# Patient Record
Sex: FEMALE | Race: WHITE | NOT HISPANIC OR LATINO | Employment: FULL TIME | URBAN - NONMETROPOLITAN AREA
[De-identification: names, ages, dates, MRNs, and addresses within clinical notes are randomized per-mention and may not be internally consistent; named-entity substitution may affect disease eponyms.]

---

## 2023-04-03 ENCOUNTER — OFFICE VISIT (OUTPATIENT)
Dept: URGENT CARE | Facility: CLINIC | Age: 43
End: 2023-04-03

## 2023-04-03 VITALS
HEART RATE: 88 BPM | TEMPERATURE: 97.8 F | DIASTOLIC BLOOD PRESSURE: 62 MMHG | SYSTOLIC BLOOD PRESSURE: 110 MMHG | OXYGEN SATURATION: 99 % | RESPIRATION RATE: 18 BRPM

## 2023-04-03 DIAGNOSIS — J06.9 ACUTE RESPIRATORY DISEASE: Primary | ICD-10-CM

## 2023-04-03 DIAGNOSIS — J02.9 SORE THROAT: ICD-10-CM

## 2023-04-03 LAB — S PYO AG THROAT QL: NEGATIVE

## 2023-04-03 RX ORDER — BUTYROSPERMUM PARKII(SHEA BUTTER), SIMMONDSIA CHINENSIS (JOJOBA) SEED OIL, ALOE BARBADENSIS LEAF EXTRACT .01; 1; 3.5 G/100G; G/100G; G/100G
LIQUID TOPICAL
COMMUNITY
Start: 2019-09-01

## 2023-04-03 RX ORDER — BROMPHENIRAMINE MALEATE, PSEUDOEPHEDRINE HYDROCHLORIDE, AND DEXTROMETHORPHAN HYDROBROMIDE 2; 30; 10 MG/5ML; MG/5ML; MG/5ML
10 SYRUP ORAL 4 TIMES DAILY PRN
Qty: 120 ML | Refills: 0 | Status: SHIPPED | OUTPATIENT
Start: 2023-04-03

## 2023-04-03 RX ORDER — MULTIVITAMIN/IRON/FOLIC ACID 18MG-0.4MG
TABLET ORAL
COMMUNITY

## 2023-04-03 NOTE — LETTER
April 3, 2023     Patient: Pepe Broderick   YOB: 1980   Date of Visit: 4/3/2023       To Whom It May Concern: It is my medical opinion that Pepe Broderick may return provided symptoms have improved and has remained fever free for 24 hours without fever reducing medications  If you have any questions or concerns, please don't hesitate to call           Sincerely,        Jesse Garland PA-C    CC: No Recipients

## 2023-04-03 NOTE — PROGRESS NOTES
[facility]        NAME: Pepe Broderick is a 36 y o  female  : 1980    MRN: [id number]  DATE: April 3, 2023  TIME: 12:26 PM    Assessment and Plan   Acute respiratory disease [J06 9]  1  Acute respiratory disease  brompheniramine-pseudoephedrine-DM 30-2-10 MG/5ML syrup      2  Sore throat  POCT rapid strepA    Throat culture            Patient Instructions     Bromfed DM as prescribed  Flonase over the counter  Vitamin D3 2000 IU daily  Vitamin C 1000mg twice per day  Multivitamin daily  Fluids and rest  Salt water gargles  Throat Coat Tea with Honey  Follow up with PCP in 3-5 days  Proceed to  ER if symptoms worsen  Chief Complaint     Chief Complaint   Patient presents with   • Earache   • Sore Throat   • Headache     Started Friday, bilateral earache, sore throat  OTC mucinex  Request note for Monday   • Cough         History of Present Illness       Patient has been taking mucinex for symptoms  Earache   There is pain in both ears  This is a new problem  Episode onset: Friday  There has been no fever  Associated symptoms include coughing (productive), headaches, rhinorrhea and a sore throat  Pertinent negatives include no abdominal pain, diarrhea, ear discharge, neck pain or vomiting  Sore Throat   This is a new problem  The current episode started in the past 7 days  The problem has been gradually worsening  Neither side of throat is experiencing more pain than the other  There has been no fever  The pain is at a severity of 4/10  Associated symptoms include congestion, coughing (productive), ear pain, headaches, a hoarse voice, a plugged ear sensation and swollen glands  Pertinent negatives include no abdominal pain, diarrhea, drooling, ear discharge, neck pain, shortness of breath, stridor, trouble swallowing or vomiting  She has had no exposure to strep or mono  Review of Systems   Review of Systems   Constitutional: Negative for chills and fever     HENT: Positive for congestion, ear pain, hoarse voice, rhinorrhea, sinus pressure (minor) and sore throat  Negative for drooling, ear discharge, sinus pain and trouble swallowing  Respiratory: Positive for cough (productive)  Negative for shortness of breath and stridor  Gastrointestinal: Negative for abdominal pain, diarrhea and vomiting  Musculoskeletal: Negative for neck pain  Neurological: Positive for headaches  Current Medications       Current Outpatient Medications:   •  brompheniramine-pseudoephedrine-DM 30-2-10 MG/5ML syrup, Take 10 mL by mouth 4 (four) times a day as needed for congestion or cough, Disp: 120 mL, Rfl: 0  •  Cholecalciferol (D3 ) 50 MCG ( UT) CAPS, , Disp: , Rfl:   •  cyanocobalamin (VITAMIN B-12) 1000 MCG tablet, Take 1,000 mcg by mouth daily, Disp: , Rfl:   •  multivitamin-minerals (CENTRUM ADULTS) tablet, , Disp: , Rfl:     Current Allergies     Allergies as of 2023   • (No Known Allergies)            The following portions of the patient's history were reviewed and updated as appropriate: allergies, current medications, past family history, past medical history, past social history, past surgical history and problem list      History reviewed  No pertinent past medical history  Past Surgical History:   Procedure Laterality Date   • APPENDECTOMY     •  SECTION         No family history on file  Medications have been verified  Objective   /62   Pulse 88   Temp 97 8 °F (36 6 °C)   Resp 18   LMP  (LMP Unknown) Comment: uterine ablation  SpO2 99%   No LMP recorded (lmp unknown)  Physical Exam     Physical Exam  Vitals reviewed  Constitutional:       General: She is not in acute distress  Appearance: She is well-developed  She is not diaphoretic  HENT:      Head: Normocephalic        Right Ear: Tympanic membrane and external ear normal       Left Ear: Tympanic membrane and external ear normal       Nose: Nose normal  Mouth/Throat:      Pharynx: Uvula midline  Posterior oropharyngeal erythema present  No oropharyngeal exudate  Tonsils: No tonsillar exudate  Eyes:      Conjunctiva/sclera: Conjunctivae normal    Cardiovascular:      Rate and Rhythm: Normal rate and regular rhythm  Heart sounds: Normal heart sounds  No murmur heard  No friction rub  No gallop  Pulmonary:      Effort: Pulmonary effort is normal  No respiratory distress  Breath sounds: Normal breath sounds  No wheezing, rhonchi or rales  Lymphadenopathy:      Cervical: No cervical adenopathy  Skin:     General: Skin is warm  Neurological:      Mental Status: She is alert and oriented to person, place, and time  Psychiatric:         Behavior: Behavior normal          Thought Content:  Thought content normal          Judgment: Judgment normal

## 2023-04-03 NOTE — PATIENT INSTRUCTIONS
Bromfed DM as prescribed  Flonase over the counter  Vitamin D3 2000 IU daily  Vitamin C 1000mg twice per day  Multivitamin daily  Fluids and rest  Salt water gargles  Throat Coat Tea with Honey  Follow up with PCP in 3-5 days  Proceed to  ER if symptoms worsen

## 2023-04-05 LAB — BACTERIA THROAT CULT: NORMAL
